# Patient Record
Sex: FEMALE | Race: WHITE | NOT HISPANIC OR LATINO | Employment: FULL TIME | URBAN - NONMETROPOLITAN AREA
[De-identification: names, ages, dates, MRNs, and addresses within clinical notes are randomized per-mention and may not be internally consistent; named-entity substitution may affect disease eponyms.]

---

## 2017-10-04 ENCOUNTER — OFFICE VISIT (OUTPATIENT)
Dept: RETAIL CLINIC | Facility: CLINIC | Age: 52
End: 2017-10-04

## 2017-10-04 VITALS — TEMPERATURE: 98.2 F

## 2017-10-04 DIAGNOSIS — Z23 NEED FOR IMMUNIZATION AGAINST INFLUENZA: Primary | ICD-10-CM

## 2017-10-04 NOTE — PROGRESS NOTES
Kristin Villalba is a 52 y.o. female who presents to the clinic for a flu shot. No fever or illness today. No contraindications for flu vaccine. Kristin filled out questionnaire and signed consent.    History of Present Illness No illness today.  Flu shot only.     The following portions of the patient's history were reviewed and updated as appropriate: allergies, current medications, past family history, past medical history, past social history, past surgical history and problem list.        Review of Systems   All other systems reviewed and are negative.        Objective   Physical Exam   Constitutional: She is oriented to person, place, and time. She appears well-developed and well-nourished.   HENT:   Head: Normocephalic and atraumatic.   Eyes: Pupils are equal, round, and reactive to light.   Neck: Neck supple.   Neurological: She is alert and oriented to person, place, and time.   Skin: Skin is warm, dry and intact.   Psychiatric: She has a normal mood and affect. Her behavior is normal. Judgment and thought content normal.         Assessment/Plan   Influenza vaccination    Flu shot given see immunizations and vaxcare form for more information.  Questionnaire, registration and health history completed.  Follow up yrly.         Follow up as needed

## 2017-10-04 NOTE — PATIENT INSTRUCTIONS
Flu shot given see immunizations and vaxcare form for more information.  Questionnaire, registration and health history completed.  Follow up yrly.

## 2018-01-19 ENCOUNTER — TRANSCRIBE ORDERS (OUTPATIENT)
Dept: ADMINISTRATIVE | Facility: HOSPITAL | Age: 53
End: 2018-01-19

## 2018-02-12 ENCOUNTER — APPOINTMENT (OUTPATIENT)
Dept: DIABETES SERVICES | Facility: HOSPITAL | Age: 53
End: 2018-02-12

## 2018-02-12 ENCOUNTER — HOSPITAL ENCOUNTER (OUTPATIENT)
Dept: DIABETES SERVICES | Facility: HOSPITAL | Age: 53
End: 2018-02-12

## 2018-05-29 ENCOUNTER — OFFICE VISIT (OUTPATIENT)
Dept: GASTROENTEROLOGY | Facility: CLINIC | Age: 53
End: 2018-05-29

## 2018-05-29 VITALS
SYSTOLIC BLOOD PRESSURE: 122 MMHG | BODY MASS INDEX: 30.82 KG/M2 | HEART RATE: 94 BPM | DIASTOLIC BLOOD PRESSURE: 76 MMHG | TEMPERATURE: 98.1 F | WEIGHT: 185 LBS | OXYGEN SATURATION: 98 % | HEIGHT: 65 IN

## 2018-05-29 DIAGNOSIS — Z86.010 HX OF COLONIC POLYPS: ICD-10-CM

## 2018-05-29 DIAGNOSIS — R13.19 OTHER DYSPHAGIA: Primary | ICD-10-CM

## 2018-05-29 DIAGNOSIS — Z83.71 FAMILY HISTORY OF POLYPS IN THE COLON: ICD-10-CM

## 2018-05-29 DIAGNOSIS — K21.9 GASTROESOPHAGEAL REFLUX DISEASE, ESOPHAGITIS PRESENCE NOT SPECIFIED: ICD-10-CM

## 2018-05-29 PROCEDURE — 99204 OFFICE O/P NEW MOD 45 MIN: CPT | Performed by: NURSE PRACTITIONER

## 2018-05-29 RX ORDER — ALPRAZOLAM 0.5 MG/1
0.5 TABLET ORAL 2 TIMES DAILY PRN
COMMUNITY

## 2018-05-29 RX ORDER — SODIUM, POTASSIUM,MAG SULFATES 17.5-3.13G
2 SOLUTION, RECONSTITUTED, ORAL ORAL ONCE
Qty: 2 BOTTLE | Refills: 0 | Status: SHIPPED | OUTPATIENT
Start: 2018-05-29 | End: 2018-05-29

## 2018-05-29 NOTE — PROGRESS NOTES
"Chief Complaint:   Chief Complaint   Patient presents with   • Difficulty Swallowing     Patient states that she feels like her medication, apples, and meat feel like they are getting stuck.   • Colon Cancer Screening         Patient ID: Luana Villalba is a 53 y.o. female     History of Present Illness: This is a very pleasant 53-year-old female who comes in today with complaints of difficulty swallowing.  She states that she has begun to have difficulty swallowing food such as medication, apples and meat.  She states that they feel as though they get \"stuck or hung\" in her mid esophageal area.  She states that she does have a history of GERD and it feels mostly controlled but if she forgets any medicine then it will flare up.  The patient's last EGD was performed on 10/7/13 for heartburn and heme-positive stools.  Small hiatal hernia found otherwise normal.    The patient's last colonoscopy was performed on 7/24/13 for heme positive stools.  The patient also does carry a previous history of colon polyps.  On 7/24/13 colonoscopy was found to be normal.    The patient denies any nausea, vomiting, epigastric pain, pyrosis or hematemesis.  The patient denies any fever or chills.  Denies any melena or hematochezia.  Denies any unintentional weight loss or loss of appetite.    Past Medical History:   Diagnosis Date   • Acid reflux    • Colon polyp    • Depression    • Hyperlipidemia    • Type 2 diabetes mellitus        Past Surgical History:   Procedure Laterality Date   • APPENDECTOMY     • CHOLECYSTECTOMY     • COLONOSCOPY  07/24/2013   • KNEE SURGERY     • UPPER GASTROINTESTINAL ENDOSCOPY  10/07/2013         Current Outpatient Prescriptions:   •  ALPRAZolam (XANAX) 0.5 MG tablet, Take 0.5 mg by mouth 2 (Two) Times a Day As Needed for Anxiety., Disp: , Rfl:   •  aspirin 81 MG tablet, Take 81 mg by mouth daily, Disp: , Rfl:   •  cyclobenzaprine (FLEXERIL) 10 MG tablet, 3 (Three) Times a Day., Disp: , Rfl:   •  " "FLUoxetine (PROzac) 40 MG capsule, Take 40 mg by mouth daily, Disp: , Rfl:   •  lisinopril (PRINIVIL,ZESTRIL) 40 MG tablet, Take 40 mg by mouth daily, Disp: , Rfl:   •  metFORMIN (GLUCOPHAGE) 500 MG tablet, Take 500 mg by mouth once daily, Disp: , Rfl:   •  pantoprazole (PROTONIX) 40 MG pack packet, Take 40 mg by mouth every morning (before breakfast), Disp: , Rfl:   •  sodium-potassium-magnesium sulfates (SUPREP BOWEL PREP KIT) 17.5-3.13-1.6 GM/180ML solution oral solution, Take 2 bottles by mouth 1 (One) Time for 1 dose. Split dose prep as directed by office instructions provided.  2 bottles = one kit., Disp: 2 bottle, Rfl: 0    Allergies   Allergen Reactions   • Ibuprofen Swelling   • Codeine Nausea And Vomiting       Social History     Social History   • Marital status:      Spouse name: N/A   • Number of children: N/A   • Years of education: N/A     Occupational History   •       Teacher [employer]     Social History Main Topics   • Smoking status: Never Smoker   • Smokeless tobacco: Never Used   • Alcohol use No   • Drug use: No   • Sexual activity: No     Other Topics Concern   • Not on file     Social History Narrative    No second hand smoke exposure.         Family History   Problem Relation Age of Onset   • Colon polyps Mother    • Colon cancer Neg Hx        Vitals:    05/29/18 1406   BP: 122/76   Pulse: 94   Temp: 98.1 °F (36.7 °C)   SpO2: 98%   Weight: 83.9 kg (185 lb)   Height: 165.1 cm (65\")       Review of Systems:    General:    Present -feeling well   Skin:    Not Present-Rash   HEENT:     Not Present-Acute visual changes or Acute hearing changes   Neck :    Not Present- swollen glands   Genitourinary:      Not Present- burning, frequency, urgency hematuria, dysuria,   Cardiovascular:   Not Present-chest pain, palpitations, or pressure   Respiratory:   Not Present- shortness of breath or cough   Gastrointestinal:  Musculoskeletal:  Neurological:  Psychiatric:   Present as mentioned in the HP    " Not Present. Recent gait disturbances.    Not Present-Seizures and weakness in extremities.    Not Present- Anxiety or Depression.       Physical Exam:    General Appearance:    Alert, cooperative, in no acute distress   Psych:    Mood appropriate    Eyes:          conjunctivae and sclerae normal, no   icterus, no pallor   ENMT:    Ears appear intact with no abnormalities noted oral mucosa moist   Neck:   No adenopathy, supple, trachea midline, no thyromegaly, no   carotid bruit, no JVD    Cardiovascular:    Regular rhythm and normal rate, normal S1 and S2, no            murmur, no gallop, no rub, no click   Gastrointestinal:     Inspection normal.  Normal bowel sounds, no masses, no organomegaly, soft round non-tender, non-distended, no guarding, no rebound or tenderness. No hepatosplenomegaly.   Skin:   No bleeding, bruising or rash   Neurologic:   nonfocal       No results found for: WBC, HGB, HCT, PLT     No results found for: NA, K, CL, CO2, BUN, CREATININE, BILITOT, ALKPHOS, ALT, AST, GLUCOSE    No results found for: INR    Body mass index is 30.79 kg/m². Patient's Body mass index is 30.79 kg/m². BMI is above normal parameters. Recommendations include: no follow-up required.    Assessment and Plan:  Assessment/Plan   Luana was seen today for difficulty swallowing and colon cancer screening.    Diagnoses and all orders for this visit:    Other dysphagia  -     Case Request; Standing  -     Case Request    Gastroesophageal reflux disease, esophagitis presence not specified  -     Case Request; Standing  -     Case Request    Hx of colonic polyps  -     Case Request; Standing  -     Case Request    Family history of polyps in the colon  Comments:  mother  Orders:  -     Case Request; Standing  -     Case Request    Other orders  -     Implement Anesthesia Orders Day of Procedure; Standing  -     Obtain Informed Consent; Standing  -     Verify bowel prep was successful; Standing  -     sodium-potassium-magnesium  sulfates (SUPREP BOWEL PREP KIT) 17.5-3.13-1.6 GM/180ML solution oral solution; Take 2 bottles by mouth 1 (One) Time for 1 dose. Split dose prep as directed by office instructions provided.  2 bottles = one kit.    As scheduled the patient for an EGD and colonoscopy as well.  I have discussed with her to continue her Protonix as she has been that should her symptoms worsen for her to call me and we will increase her Protonix to twice a day.       Patient Instructions   Food Choices for Gastroesophageal Reflux Disease, Adult  When you have gastroesophageal reflux disease (GERD), the foods you eat and your eating habits are very important. Choosing the right foods can help ease your discomfort.  What guidelines do I need to follow?  · Choose fruits, vegetables, whole grains, and low-fat dairy products.  · Choose low-fat meat, fish, and poultry.  · Limit fats such as oils, salad dressings, butter, nuts, and avocado.  · Keep a food diary. This helps you identify foods that cause symptoms.  · Avoid foods that cause symptoms. These may be different for everyone.  · Eat small meals often instead of 3 large meals a day.  · Eat your meals slowly, in a place where you are relaxed.  · Limit fried foods.  · Cook foods using methods other than frying.  · Avoid drinking alcohol.  · Avoid drinking large amounts of liquids with your meals.  · Avoid bending over or lying down until 2-3 hours after eating.  What foods are not recommended?  These are some foods and drinks that may make your symptoms worse:  Vegetables   Tomatoes. Tomato juice. Tomato and spaghetti sauce. Chili peppers. Onion and garlic. Horseradish.  Fruits   Oranges, grapefruit, and lemon (fruit and juice).  Meats   High-fat meats, fish, and poultry. This includes hot dogs, ribs, ham, sausage, salami, and wells.  Dairy   Whole milk and chocolate milk. Sour cream. Cream. Butter. Ice cream. Cream cheese.  Drinks   Coffee and tea. Bubbly (carbonated) drinks or energy  drinks.  Condiments   Hot sauce. Barbecue sauce.  Sweets/Desserts   Chocolate and cocoa. Donuts. Peppermint and spearmint.  Fats and Oils   High-fat foods. This includes French fries and potato chips.  Other   Vinegar. Strong spices. This includes black pepper, white pepper, red pepper, cayenne, holt powder, cloves, ginger, and chili powder.  The items listed above may not be a complete list of foods and drinks to avoid. Contact your dietitian for more information.   This information is not intended to replace advice given to you by your health care provider. Make sure you discuss any questions you have with your health care provider.  Document Released: 06/18/2013 Document Revised: 05/25/2017 Document Reviewed: 10/22/2014  [employer] Patient Education © 2017 [employer]        Next follow-up appointment      EMR Dragon/Transcription disclaimer:  Much of this encounter note is an electronic transcription/translation of spoken language to printed text. The electronic translation of spoken language may permit erroneous, or at times, nonsensical words or phrases to be inadvertently transcribed; although I have reviewed the note for such errors, some may still exist.

## 2018-05-30 PROBLEM — R13.19 OTHER DYSPHAGIA: Status: ACTIVE | Noted: 2018-05-30

## 2018-05-30 PROBLEM — Z83.719 FAMILY HISTORY OF POLYPS IN THE COLON: Status: ACTIVE | Noted: 2018-05-30

## 2018-05-30 PROBLEM — Z86.010 HX OF COLONIC POLYPS: Status: ACTIVE | Noted: 2018-05-30

## 2018-05-30 PROBLEM — Z83.71 FAMILY HISTORY OF POLYPS IN THE COLON: Status: ACTIVE | Noted: 2018-05-30

## 2018-05-30 PROBLEM — Z86.0100 HX OF COLONIC POLYPS: Status: ACTIVE | Noted: 2018-05-30

## 2018-05-30 PROBLEM — K21.9 GASTROESOPHAGEAL REFLUX DISEASE: Status: ACTIVE | Noted: 2018-05-30

## 2018-07-18 ENCOUNTER — ANESTHESIA (OUTPATIENT)
Dept: GASTROENTEROLOGY | Facility: HOSPITAL | Age: 53
End: 2018-07-18

## 2018-07-18 ENCOUNTER — ANESTHESIA EVENT (OUTPATIENT)
Dept: GASTROENTEROLOGY | Facility: HOSPITAL | Age: 53
End: 2018-07-18

## 2018-07-18 ENCOUNTER — HOSPITAL ENCOUNTER (OUTPATIENT)
Facility: HOSPITAL | Age: 53
Setting detail: HOSPITAL OUTPATIENT SURGERY
Discharge: HOME | End: 2018-07-18
Attending: INTERNAL MEDICINE | Admitting: NURSE ANESTHETIST, CERTIFIED REGISTERED

## 2018-07-18 ENCOUNTER — TELEPHONE (OUTPATIENT)
Dept: GASTROENTEROLOGY | Facility: CLINIC | Age: 53
End: 2018-07-18

## 2018-07-18 VITALS
HEIGHT: 64 IN | RESPIRATION RATE: 19 BRPM | HEART RATE: 68 BPM | WEIGHT: 183 LBS | OXYGEN SATURATION: 98 % | DIASTOLIC BLOOD PRESSURE: 81 MMHG | SYSTOLIC BLOOD PRESSURE: 125 MMHG | BODY MASS INDEX: 31.24 KG/M2 | TEMPERATURE: 98.2 F

## 2018-07-18 DIAGNOSIS — K21.9 GASTROESOPHAGEAL REFLUX DISEASE, ESOPHAGITIS PRESENCE NOT SPECIFIED: ICD-10-CM

## 2018-07-18 DIAGNOSIS — R13.19 OTHER DYSPHAGIA: ICD-10-CM

## 2018-07-18 PROBLEM — Z86.010 HX OF COLONIC POLYPS: Status: RESOLVED | Noted: 2018-05-30 | Resolved: 2018-07-18

## 2018-07-18 PROBLEM — Z83.719 FAMILY HISTORY OF POLYPS IN THE COLON: Status: RESOLVED | Noted: 2018-05-30 | Resolved: 2018-07-18

## 2018-07-18 PROBLEM — Z83.71 FAMILY HISTORY OF POLYPS IN THE COLON: Status: RESOLVED | Noted: 2018-05-30 | Resolved: 2018-07-18

## 2018-07-18 PROBLEM — Z86.0100 HX OF COLONIC POLYPS: Status: RESOLVED | Noted: 2018-05-30 | Resolved: 2018-07-18

## 2018-07-18 LAB — GLUCOSE BLDC GLUCOMTR-MCNC: 143 MG/DL (ref 70–130)

## 2018-07-18 PROCEDURE — 43239 EGD BIOPSY SINGLE/MULTIPLE: CPT | Performed by: INTERNAL MEDICINE

## 2018-07-18 PROCEDURE — 88305 TISSUE EXAM BY PATHOLOGIST: CPT | Performed by: INTERNAL MEDICINE

## 2018-07-18 PROCEDURE — 25010000002 PROPOFOL 10 MG/ML EMULSION: Performed by: NURSE ANESTHETIST, CERTIFIED REGISTERED

## 2018-07-18 PROCEDURE — 82962 GLUCOSE BLOOD TEST: CPT

## 2018-07-18 PROCEDURE — G0105 COLORECTAL SCRN; HI RISK IND: HCPCS | Performed by: INTERNAL MEDICINE

## 2018-07-18 RX ORDER — SODIUM CHLORIDE 9 MG/ML
100 INJECTION, SOLUTION INTRAVENOUS CONTINUOUS
Status: CANCELLED | OUTPATIENT
Start: 2018-07-18

## 2018-07-18 RX ORDER — LIDOCAINE HYDROCHLORIDE 20 MG/ML
INJECTION, SOLUTION INFILTRATION; PERINEURAL AS NEEDED
Status: DISCONTINUED | OUTPATIENT
Start: 2018-07-18 | End: 2018-07-18 | Stop reason: SURG

## 2018-07-18 RX ORDER — SODIUM CHLORIDE 0.9 % (FLUSH) 0.9 %
3 SYRINGE (ML) INJECTION AS NEEDED
Status: DISCONTINUED | OUTPATIENT
Start: 2018-07-18 | End: 2018-07-18 | Stop reason: HOSPADM

## 2018-07-18 RX ORDER — SODIUM CHLORIDE 9 MG/ML
500 INJECTION, SOLUTION INTRAVENOUS CONTINUOUS PRN
Status: DISCONTINUED | OUTPATIENT
Start: 2018-07-18 | End: 2018-07-18 | Stop reason: HOSPADM

## 2018-07-18 RX ORDER — PROPOFOL 10 MG/ML
VIAL (ML) INTRAVENOUS AS NEEDED
Status: DISCONTINUED | OUTPATIENT
Start: 2018-07-18 | End: 2018-07-18 | Stop reason: SURG

## 2018-07-18 RX ORDER — SODIUM CHLORIDE 0.9 % (FLUSH) 0.9 %
1-10 SYRINGE (ML) INJECTION AS NEEDED
Status: CANCELLED | OUTPATIENT
Start: 2018-07-18

## 2018-07-18 RX ADMIN — PROPOFOL 30 MG: 10 INJECTION, EMULSION INTRAVENOUS at 07:51

## 2018-07-18 RX ADMIN — PROPOFOL 60 MG: 10 INJECTION, EMULSION INTRAVENOUS at 07:32

## 2018-07-18 RX ADMIN — PROPOFOL 30 MG: 10 INJECTION, EMULSION INTRAVENOUS at 07:41

## 2018-07-18 RX ADMIN — PROPOFOL 30 MG: 10 INJECTION, EMULSION INTRAVENOUS at 07:36

## 2018-07-18 RX ADMIN — PROPOFOL 30 MG: 10 INJECTION, EMULSION INTRAVENOUS at 07:38

## 2018-07-18 RX ADMIN — PROPOFOL 30 MG: 10 INJECTION, EMULSION INTRAVENOUS at 07:46

## 2018-07-18 RX ADMIN — PROPOFOL 30 MG: 10 INJECTION, EMULSION INTRAVENOUS at 07:39

## 2018-07-18 RX ADMIN — PROPOFOL 30 MG: 10 INJECTION, EMULSION INTRAVENOUS at 07:34

## 2018-07-18 RX ADMIN — PROPOFOL 30 MG: 10 INJECTION, EMULSION INTRAVENOUS at 07:43

## 2018-07-18 RX ADMIN — LIDOCAINE HYDROCHLORIDE 50 MG: 20 INJECTION, SOLUTION INFILTRATION; PERINEURAL at 07:32

## 2018-07-18 RX ADMIN — SODIUM CHLORIDE: 9 INJECTION, SOLUTION INTRAVENOUS at 07:31

## 2018-07-18 RX ADMIN — SODIUM CHLORIDE 500 ML: 9 INJECTION, SOLUTION INTRAVENOUS at 07:13

## 2018-07-18 RX ADMIN — PROPOFOL 30 MG: 10 INJECTION, EMULSION INTRAVENOUS at 07:48

## 2018-07-18 RX ADMIN — PROPOFOL 30 MG: 10 INJECTION, EMULSION INTRAVENOUS at 07:45

## 2018-07-18 RX ADMIN — PROPOFOL 30 MG: 10 INJECTION, EMULSION INTRAVENOUS at 07:50

## 2018-07-18 NOTE — ANESTHESIA POSTPROCEDURE EVALUATION
Patient: PUNEET Villalba    Procedure Summary     Date:  07/18/18 Room / Location:  [facility] ENDOSCOPY 5 / [facility]    Anesthesia Start:  0731 Anesthesia Stop:  0800    Procedures:       ESOPHAGOGASTRODUODENOSCOPY WITH ANESTHESIA (N/A Esophagus)      COLONOSCOPY WITH ANESTHESIA (N/A ) Diagnosis:       Hx of colonic polyps      Family history of polyps in the colon      Other dysphagia      Gastroesophageal reflux disease, esophagitis presence not specified      (Hx of colonic polyps [Z86.010])      (Family history of polyps in the colon [Z83.71])      (Other dysphagia [R13.19])      (Gastroesophageal reflux disease, esophagitis presence not specified [K21.9])    Surgeon:  Meghann Ovalle MD Provider:  Jayme Brandt CRNA    Anesthesia Type:  general ASA Status:  2          Anesthesia Type: general  Last vitals  BP   132/84 (07/18/18 0656)   Temp   98.2 °F (36.8 °C) (07/18/18 0656)   Pulse   94 (07/18/18 0656)   Resp   18 (07/18/18 0656)     SpO2   98 % (07/18/18 0656)     Post Anesthesia Care and Evaluation    Patient location during evaluation: PHASE II  Patient participation: complete - patient participated  Level of consciousness: awake  Pain score: 0  Pain management: adequate  Airway patency: patent  Anesthetic complications: No anesthetic complications  PONV Status: none  Cardiovascular status: acceptable  Respiratory status: acceptable  Hydration status: acceptable

## 2018-07-18 NOTE — H&P
Chief Complaint:   Dysphagia and colon cancer screening     Subjective     HPI:   She has been having complaints of dysphagia for pills and eating apples although it seems to be less now that she is here today.    Mother had colon polyps.  She is not certain of the age that she thinks it was greater in 60 years old.  Patient's last colonoscopy was in July 2013.  Although she is labeled as having had polyps, chart review indicates that she has not had any polyps.    Past Medical History:   Past Medical History:   Diagnosis Date   • Acid reflux    • Colon polyp    • Depression    • Hyperlipidemia    • Hypertension    • Type 2 diabetes mellitus (CMS/HCC)        Past Surgical History:  Past Surgical History:   Procedure Laterality Date   • APPENDECTOMY     • CHOLECYSTECTOMY     • COLONOSCOPY  07/24/2013   • KNEE SURGERY     • UPPER GASTROINTESTINAL ENDOSCOPY  10/07/2013        Family History:  Family History   Problem Relation Age of Onset   • Colon polyps Mother    • Colon cancer Neg Hx        Social History:   reports that she has never smoked. She has never used smokeless tobacco. She reports that she does not drink alcohol or use drugs.    Medications:   Prescriptions Prior to Admission   Medication Sig Dispense Refill Last Dose   • ALPRAZolam (XANAX) 0.5 MG tablet Take 0.5 mg by mouth 2 (Two) Times a Day As Needed for Anxiety.   More than a month at Unknown time   • aspirin 81 MG tablet Take 81 mg by mouth daily   7/16/2018   • cyclobenzaprine (FLEXERIL) 10 MG tablet 3 (Three) Times a Day.   7/16/2018   • FLUoxetine (PROzac) 40 MG capsule Take 40 mg by mouth daily   7/16/2018   • lisinopril (PRINIVIL,ZESTRIL) 40 MG tablet Take 40 mg by mouth daily   7/16/2018   • metFORMIN (GLUCOPHAGE) 500 MG tablet Take 500 mg by mouth once daily   7/16/2018   • pantoprazole (PROTONIX) 40 MG pack packet Take 40 mg by mouth every morning (before breakfast)   7/16/2018       Allergies:  Ibuprofen and Codeine    ROS:    General:  "Weight stable  Resp: No SOA  Cardiovascular: No CP      Objective     /84 (Patient Position: Sitting)   Pulse 94   Temp 98.2 °F (36.8 °C) (Temporal Artery )   Resp 18   Ht 162.6 cm (64\")   Wt 83 kg (183 lb)   SpO2 98%   BMI 31.41 kg/m²     Physical Exam   Constitutional: Pt is oriented to person, place, and in no distress.  Eyes: No icterus  ENMT: Unremarkable   Cardiovascular: Normal rate, regular rhythm.    Pulmonary/Chest: No distress.  No audible wheezes  Abdominal: Soft.   Skin: Skin is warm and dry.   Psychiatric: Mood, memory, affect and judgment appear normal.     Assessment/Plan     Diagnosis:  Dysphagia  Screening    Anticipated Surgical Procedure:  Colonoscopy    The risks, benefits, and alternatives of colonoscopy were reviewed with the patient today.  Risks including perforation of the colon possibly requiring surgery or colostomy.  Additional risks include risk of bleeding from biopsies or removal of colon tissue.  There is also the risk of a drug reaction or problems with anesthesia.  This will be discussed with the further by the anesthesia team on the day of the procedure.  Lastly there is a possibility of missing a colon polyp or cancer.  The benefits include the diagnosis and management of disease of the colon and rectum.  Alternatives to colonoscopy include barium enema, laboratory testing, radiographic evaluation, or no intervention.  The patient verbalizes understanding and agrees.    Much of this encounter note is an electronic transcription/translation of spoken language to printed text. The electronic translation of spoken language may permit erroneous, or at times, nonsensical words or phrases to be inadvertently transcribed; although I have reviewed the note for such errors, some may still exist.        "

## 2018-07-18 NOTE — ANESTHESIA PREPROCEDURE EVALUATION
Anesthesia Evaluation     Patient summary reviewed   no history of anesthetic complications:  NPO Solid Status: > 8 hours  NPO Liquid Status: > 4 hours           Airway   Mallampati: I  TM distance: >3 FB  Neck ROM: full  No difficulty expected  Dental - normal exam     Pulmonary - negative pulmonary ROS and normal exam   Cardiovascular - normal exam  Exercise tolerance: excellent (>7 METS)    (+) hypertension well controlled less than 2 medications, hyperlipidemia,       Neuro/Psych  (+) psychiatric history Depression,     GI/Hepatic/Renal/Endo    (+)  GERD well controlled,  diabetes mellitus type 2 well controlled,     Musculoskeletal (-) negative ROS    Abdominal  - normal exam   Substance History - negative use     OB/GYN          Other - negative ROS                       Anesthesia Plan    ASA 2     general     intravenous induction   Anesthetic plan and risks discussed with patient.

## 2018-07-19 ENCOUNTER — TELEPHONE (OUTPATIENT)
Dept: GASTROENTEROLOGY | Facility: CLINIC | Age: 53
End: 2018-07-19

## 2018-07-19 LAB
CYTO UR: NORMAL
LAB AP CASE REPORT: NORMAL
LAB AP CLINICAL INFORMATION: NORMAL
PATH REPORT.FINAL DX SPEC: NORMAL
PATH REPORT.GROSS SPEC: NORMAL

## 2018-07-19 NOTE — TELEPHONE ENCOUNTER
----- Message from Meghann Ovalle MD sent at 7/19/2018  2:50 PM CDT -----  I am writing to inform you that your endoscopy biopsies were good.     If you have any questions, please call our office.      Sincerely,        Meghann Ovalle MD

## 2018-10-25 ENCOUNTER — OFFICE VISIT (OUTPATIENT)
Dept: RETAIL CLINIC | Facility: CLINIC | Age: 53
End: 2018-10-25

## 2018-10-25 VITALS — TEMPERATURE: 98.1 F

## 2018-10-25 DIAGNOSIS — Z23 FLU VACCINE NEED: Primary | ICD-10-CM

## 2018-10-25 NOTE — PATIENT INSTRUCTIONS
Forms completed.  Flublok given see immunizations for further information.  VIS given. Follow up as needed.

## 2018-10-25 NOTE — PROGRESS NOTES
PUNEET Villalba is a 53 y.o. female who presents to the clinic for a flu shot. No fever or illness today. No contraindications for flu vaccine. PUNEET filled out questionnaire and signed consent.    History of Present Illness No illness or fever  The following portions of the patient's history were reviewed and updated as appropriate: allergies, current medications, past family history, past medical history, past social history, past surgical history and problem list.        Review of Systems   Constitutional: Negative for fever.         Objective   Physical Exam   Constitutional: She is oriented to person, place, and time. She appears well-developed and well-nourished.   HENT:   Head: Normocephalic and atraumatic.   Eyes: Pupils are equal, round, and reactive to light. Conjunctivae are normal.   Neck: Neck supple.   Neurological: She is alert and oriented to person, place, and time.   Skin: Skin is warm and dry.   Psychiatric: She has a normal mood and affect. Her speech is normal and behavior is normal. Judgment and thought content normal.         Assessment/Plan   Influenza vaccination    Forms completed.  Flublok given see immunizations for further information.  VIS given. Follow up as needed.

## 2019-10-03 ENCOUNTER — IMMUNIZATION (OUTPATIENT)
Dept: RETAIL CLINIC | Facility: CLINIC | Age: 54
End: 2019-10-03

## 2019-10-03 VITALS — TEMPERATURE: 98.5 F

## 2019-10-03 DIAGNOSIS — Z28.39 IMMUNIZATION DEFICIENCY: Primary | ICD-10-CM

## 2019-10-03 PROCEDURE — 90674 CCIIV4 VAC NO PRSV 0.5 ML IM: CPT | Performed by: NURSE PRACTITIONER

## 2019-10-03 PROCEDURE — 90471 IMMUNIZATION ADMIN: CPT | Performed by: NURSE PRACTITIONER

## 2019-10-03 NOTE — PROGRESS NOTES
PUNEET Villalba is a 54 y.o. female who presents to the clinic for a flu shot. No fever or illness today. No contraindications for flu vaccine. PUNEET filled out questionnaire and signed consent.    History of Present Illness No illness or fever            Review of Systems   Constitutional: Negative for fever.          Objective   Physical Exam   Constitutional: She is oriented to person, place, and time. She appears well-developed and well-nourished.   HENT:   Head: Normocephalic and atraumatic.   Eyes: Pupils are equal, round, and reactive to light.   Neck: Neck supple.   Neurological: She is alert and oriented to person, place, and time.   Skin: Skin is warm, dry and intact.   Psychiatric: She has a normal mood and affect. Her speech is normal and behavior is normal. Judgment and thought content normal.         Assessment/Plan   Influenza vaccination    Forms competed. No restrictions.  Follow up as needed.

## 2020-06-29 ENCOUNTER — TRANSCRIBE ORDERS (OUTPATIENT)
Dept: ADMINISTRATIVE | Facility: HOSPITAL | Age: 55
End: 2020-06-29

## 2020-06-29 DIAGNOSIS — Z12.31 ENCOUNTER FOR SCREENING MAMMOGRAM FOR MALIGNANT NEOPLASM OF BREAST: Primary | ICD-10-CM

## 2020-07-07 ENCOUNTER — HOSPITAL ENCOUNTER (OUTPATIENT)
Dept: MAMMOGRAPHY | Facility: HOSPITAL | Age: 55
Discharge: HOME | End: 2020-07-07
Admitting: FAMILY MEDICINE

## 2020-07-07 DIAGNOSIS — Z12.31 ENCOUNTER FOR SCREENING MAMMOGRAM FOR MALIGNANT NEOPLASM OF BREAST: ICD-10-CM

## 2020-07-07 PROCEDURE — 77063 BREAST TOMOSYNTHESIS BI: CPT

## 2020-07-07 PROCEDURE — 77067 SCR MAMMO BI INCL CAD: CPT

## 2021-04-19 ENCOUNTER — TRANSCRIBE ORDERS (OUTPATIENT)
Dept: ADMINISTRATIVE | Facility: HOSPITAL | Age: 56
End: 2021-04-19

## 2021-04-19 DIAGNOSIS — R10.2 PELVIC AND PERINEAL PAIN: Primary | ICD-10-CM

## 2021-04-20 ENCOUNTER — HOSPITAL ENCOUNTER (OUTPATIENT)
Dept: ULTRASOUND IMAGING | Facility: HOSPITAL | Age: 56
Discharge: HOME | End: 2021-04-20
Admitting: FAMILY MEDICINE

## 2021-04-20 DIAGNOSIS — R10.2 PELVIC AND PERINEAL PAIN: ICD-10-CM

## 2021-04-20 PROCEDURE — 76830 TRANSVAGINAL US NON-OB: CPT

## 2021-04-22 ENCOUNTER — LAB (OUTPATIENT)
Dept: LAB | Facility: HOSPITAL | Age: 56
End: 2021-04-22

## 2021-04-22 ENCOUNTER — TRANSCRIBE ORDERS (OUTPATIENT)
Dept: LAB | Facility: HOSPITAL | Age: 56
End: 2021-04-22

## 2021-04-22 DIAGNOSIS — R05.9 COUGH: ICD-10-CM

## 2021-04-22 DIAGNOSIS — R50.9 FEVER, UNSPECIFIED: ICD-10-CM

## 2021-04-22 DIAGNOSIS — Z01.818 PRE-OP TESTING: Primary | ICD-10-CM

## 2021-04-22 LAB — SARS-COV-2 ORF1AB RESP QL NAA+PROBE: DETECTED

## 2021-04-22 PROCEDURE — U0004 COV-19 TEST NON-CDC HGH THRU: HCPCS | Performed by: PHYSICIAN ASSISTANT

## 2021-04-22 PROCEDURE — C9803 HOPD COVID-19 SPEC COLLECT: HCPCS | Performed by: PHYSICIAN ASSISTANT

## 2021-05-04 ENCOUNTER — OFFICE VISIT (OUTPATIENT)
Dept: OBSTETRICS AND GYNECOLOGY | Facility: CLINIC | Age: 56
End: 2021-05-04

## 2021-05-04 VITALS
BODY MASS INDEX: 32.27 KG/M2 | DIASTOLIC BLOOD PRESSURE: 80 MMHG | SYSTOLIC BLOOD PRESSURE: 138 MMHG | WEIGHT: 189 LBS | HEIGHT: 64 IN

## 2021-05-04 DIAGNOSIS — R93.89 THICKENED ENDOMETRIUM: Primary | ICD-10-CM

## 2021-05-04 PROCEDURE — 58100 BIOPSY OF UTERUS LINING: CPT | Performed by: NURSE PRACTITIONER

## 2021-05-04 PROCEDURE — 88305 TISSUE EXAM BY PATHOLOGIST: CPT | Performed by: NURSE PRACTITIONER

## 2021-05-04 RX ORDER — CLONAZEPAM 0.5 MG/1
0.5 TABLET ORAL
COMMUNITY
Start: 2020-11-13

## 2021-05-04 RX ORDER — LOVASTATIN 40 MG/1
TABLET ORAL
COMMUNITY
Start: 2020-11-14

## 2021-05-04 RX ORDER — CETIRIZINE HYDROCHLORIDE 10 MG/1
10 TABLET ORAL DAILY
COMMUNITY

## 2021-05-04 NOTE — PROGRESS NOTES
"Subjective     PUNEET Villalba is a 55 y.o. female    Patient is here today as a new patient referral from Dr. Jumana Cole.  She had thickened endometrium on pelvic ultrasound.  The ultrasound was done due to pelvic pain.  The pain has since resolved.  Patient has been menopausal for approximately 7 years.  She has not had any bleeding or spotting.        /80   Ht 162.6 cm (64\")   Wt 85.7 kg (189 lb)   LMP  (LMP Unknown)   Breastfeeding No   BMI 32.44 kg/m²     Outpatient Encounter Medications as of 5/4/2021   Medication Sig Dispense Refill   • ALPRAZolam (XANAX) 0.5 MG tablet Take 0.5 mg by mouth 2 (Two) Times a Day As Needed for Anxiety.     • aspirin 81 MG tablet Take 81 mg by mouth daily     • cetirizine (zyrTEC) 10 MG tablet Take 10 mg by mouth Daily.     • clonazePAM (KlonoPIN) 0.5 MG tablet Take 0.5 mg by mouth.     • cyclobenzaprine (FLEXERIL) 10 MG tablet 3 (Three) Times a Day.     • FLUoxetine (PROzac) 40 MG capsule Take 40 mg by mouth daily     • lisinopril (PRINIVIL,ZESTRIL) 40 MG tablet Take 40 mg by mouth daily     • lovastatin (MEVACOR) 40 MG tablet      • metFORMIN (GLUCOPHAGE) 500 MG tablet Take 500 mg by mouth once daily     • pantoprazole (PROTONIX) 40 MG pack packet Take 40 mg by mouth every morning (before breakfast)       No facility-administered encounter medications on file as of 5/4/2021.       Surgical History  Past Surgical History:   Procedure Laterality Date   • APPENDECTOMY     • CHOLECYSTECTOMY     • COLONOSCOPY  07/24/2013   • COLONOSCOPY N/A 7/18/2018    Procedure: COLONOSCOPY WITH ANESTHESIA;  Surgeon: Meghann Ovalle MD;  Location: [facility] ENDOSCOPY;  Service: Gastroenterology   • ENDOSCOPY N/A 7/18/2018    Procedure: ESOPHAGOGASTRODUODENOSCOPY WITH ANESTHESIA;  Surgeon: Meghann Ovalle MD;  Location: [facility] ENDOSCOPY;  Service: Gastroenterology   • KNEE SURGERY     • UPPER GASTROINTESTINAL ENDOSCOPY  10/07/2013   • WISDOM TOOTH EXTRACTION         Family History  Family " History   Problem Relation Age of Onset   • Hypertension Father    • Diabetes Mother    • Hypertension Mother    • Breast cancer Paternal Grandmother 70   • Colon cancer Neg Hx    • Colon polyps Neg Hx    • Ovarian cancer Neg Hx    • Uterine cancer Neg Hx    • Melanoma Neg Hx    • Stroke Neg Hx    • Osteoporosis Neg Hx        The following portions of the patient's history were reviewed and updated as appropriate: allergies, current medications, past family history, past medical history, past social history, past surgical history and problem list.    Review of Systems   Constitutional: Negative for activity change, appetite change, chills, diaphoresis, fatigue, fever, unexpected weight gain and unexpected weight loss.   HENT: Negative for congestion, dental problem, drooling, ear discharge, ear pain, facial swelling, hearing loss, mouth sores, nosebleeds, postnasal drip, rhinorrhea, sinus pressure, sneezing, sore throat, swollen glands, tinnitus, trouble swallowing and voice change.    Eyes: Negative for blurred vision, double vision, photophobia, pain, discharge, redness, itching and visual disturbance.   Respiratory: Negative for apnea, cough, choking, chest tightness, shortness of breath, wheezing and stridor.    Cardiovascular: Negative for chest pain, palpitations and leg swelling.   Gastrointestinal: Negative for abdominal distention, abdominal pain, anal bleeding, blood in stool, constipation, diarrhea, nausea, rectal pain, vomiting, GERD and indigestion.   Endocrine: Negative for cold intolerance, heat intolerance, polydipsia, polyphagia and polyuria.   Genitourinary: Negative for amenorrhea, breast discharge, breast lump, breast pain, decreased libido, decreased urine volume, difficulty urinating, dyspareunia, dysuria, flank pain, frequency, genital sores, hematuria, menstrual problem, pelvic pain, pelvic pressure, urgency, urinary incontinence, vaginal bleeding, vaginal discharge and vaginal pain.    Musculoskeletal: Negative for arthralgias, back pain, gait problem, joint swelling, myalgias, neck pain, neck stiffness and bursitis.   Skin: Negative for color change, dry skin and rash.   Allergic/Immunologic: Negative for environmental allergies, food allergies and immunocompromised state.   Neurological: Negative for dizziness, tremors, seizures, syncope, facial asymmetry, speech difficulty, weakness, light-headedness, numbness, headache, memory problem and confusion.   Hematological: Negative for adenopathy. Does not bruise/bleed easily.   Psychiatric/Behavioral: Negative for agitation, behavioral problems, decreased concentration, dysphoric mood, hallucinations, self-injury, sleep disturbance, suicidal ideas, negative for hyperactivity, depressed mood and stress. The patient is not nervous/anxious.        Objective   Physical Exam  Vitals and nursing note reviewed. Exam conducted with a chaperone present.   Constitutional:       Appearance: She is well-developed.   HENT:      Head: Normocephalic and atraumatic.   Abdominal:      General: There is no distension.      Palpations: Abdomen is soft.      Tenderness: There is no abdominal tenderness.      Hernia: There is no hernia in the left inguinal area or right inguinal area.   Genitourinary:     General: Normal vulva.      Exam position: Lithotomy position.      Labia:         Right: No rash, tenderness, lesion or injury.         Left: No rash, tenderness, lesion or injury.       Vagina: Normal. No vaginal discharge, erythema, tenderness or bleeding.      Cervix: Normal.      Uterus: Normal. Not enlarged and not tender.       Adnexa: Right adnexa normal and left adnexa normal.        Right: No mass, tenderness or fullness.          Left: No mass, tenderness or fullness.     Lymphadenopathy:      Lower Body: No right inguinal adenopathy. No left inguinal adenopathy.   Skin:     General: Skin is warm and dry.   Neurological:      Mental Status: She is alert  and oriented to person, place, and time.   Psychiatric:         Mood and Affect: Mood normal.         Behavior: Behavior normal.         Thought Content: Thought content normal.         Judgment: Judgment normal.         Assessment/Plan   Diagnoses and all orders for this visit:    1. Thickened endometrium (Primary)  Comments:  Patient was referred by Dr. Jumana Cole for thickened endometrium.  Ultrasound was done at [facility].  Endometrium was 1.1 cm.  Endobiopsy was done today.  Orders:  -     Tissue Pathology Exam         Patient's (Body mass index is 32.44 kg/m².) indicates that they are obese (BMI >30) with obesity-related health conditions that include hypertension and diabetes mellitus . Obesity is unchanged. BMI is is above average; BMI management plan is completed. We discussed portion control and increasing exercise.      Alessandra Gracia, APRN  5/4/2021

## 2021-05-04 NOTE — PATIENT INSTRUCTIONS
"BMI for Adults  What is BMI?  Body mass index (BMI) is a number that is calculated from a person's weight and height. BMI can help estimate how much of a person's weight is composed of fat. BMI does not measure body fat directly. Rather, it is an alternative to procedures that directly measure body fat, which can be difficult and expensive.  BMI can help identify people who may be at higher risk for certain medical problems.  What are BMI measurements used for?  BMI is used as a screening tool to identify possible weight problems. It helps determine whether a person is obese, overweight, a healthy weight, or underweight.  BMI is useful for:  · Identifying a weight problem that may be related to a medical condition or may increase the risk for medical problems.  · Promoting changes, such as changes in diet and exercise, to help reach a healthy weight. BMI screening can be repeated to see if these changes are working.  How is BMI calculated?  BMI involves measuring your weight in relation to your height. Both height and weight are measured, and the BMI is calculated from those numbers. This can be done either in English (U[place] or metric measurements. Note that charts and online BMI calculators are available to help you find your BMI quickly and easily without having to do these calculations yourself.  To calculate your BMI in English (U.S[place] measurements:    1. Measure your weight in pounds (lb).  2. Multiply the number of pounds by 703.  ? For example, for a person who weighs 180 lb, multiply that number by 703, which equals 126,540.  3. Measure your height in inches. Then multiply that number by itself to get a measurement called \"inches squared.\"  ? For example, for a person who is 70 inches tall, the \"inches squared\" measurement is 70 inches x 70 inches, which equals 4,900 inches squared.  4. Divide the total from step 2 (number of lb x 703) by the total from step 3 (inches squared): 126,540 ÷ 4,900 = 25.8. This is " "your BMI.  To calculate your BMI in metric measurements:  1. Measure your weight in kilograms (kg).  2. Measure your height in meters (m). Then multiply that number by itself to get a measurement called \"meters squared.\"  ? For example, for a person who is 1.75 m tall, the \"meters squared\" measurement is 1.75 m x 1.75 m, which is equal to 3.1 meters squared.  3. Divide the number of kilograms (your weight) by the meters squared number. In this example: 70 ÷ 3.1 = 22.6. This is your BMI.  What do the results mean?  BMI charts are used to identify whether you are underweight, normal weight, overweight, or obese. The following guidelines will be used:  · Underweight: BMI less than 18.5.  · Normal weight: BMI between 18.5 and 24.9.  · Overweight: BMI between 25 and 29.9.  · Obese: BMI of 30 or above.  Keep these notes in mind:  · Weight includes both fat and muscle, so someone with a muscular build, such as an athlete, may have a BMI that is higher than 24.9. In cases like these, BMI is not an accurate measure of body fat.  · To determine if excess body fat is the cause of a BMI of 25 or higher, further assessments may need to be done by a health care provider.  · BMI is usually interpreted in the same way for men and women.  Where to find more information  For more information about BMI, including tools to quickly calculate your BMI, go to these websites:  · Centers for Disease Control and Prevention: www.cdc.gov  · American Heart Association: www.heart.org  · National Heart, Lung, and Blood [place]: www.nhlbi.nih.gov  Summary  · Body mass index (BMI) is a number that is calculated from a person's weight and height.  · BMI may help estimate how much of a person's weight is composed of fat. BMI can help identify those who may be at higher risk for certain medical problems.  · BMI can be measured using English measurements or metric measurements.  · BMI charts are used to identify whether you are underweight, normal " weight, overweight, or obese.  This information is not intended to replace advice given to you by your health care provider. Make sure you discuss any questions you have with your health care provider.  Document Revised: 09/09/2020 Document Reviewed: 07/17/2020  Elsevier Patient Education © 2021 [employer]

## 2021-05-04 NOTE — PROGRESS NOTES
Procedure   Procedures    An endometrial biopsy was performed in the office today.  The cervix was visualized with a speculum and prepped with Betadine.  The anterior lip was grasped with a tenaculum and a standard endometrial sampling Pipelle was passed into the uterine cavity.  The patient experienced mild cramping and the uterus sounded to 6 cm.  A scant amount of tissue was obtained with 3 passes.  The tenaculum was removed and the site was hemostatic.  She tolerated the procedure well.

## 2022-06-02 ENCOUNTER — TRANSCRIBE ORDERS (OUTPATIENT)
Dept: ADMINISTRATIVE | Facility: HOSPITAL | Age: 57
End: 2022-06-02

## 2022-06-02 DIAGNOSIS — R10.84 ABDOMINAL PAIN, GENERALIZED: Primary | ICD-10-CM

## 2022-06-06 ENCOUNTER — HOSPITAL ENCOUNTER (OUTPATIENT)
Dept: CT IMAGING | Facility: HOSPITAL | Age: 57
Discharge: HOME | End: 2022-06-06
Admitting: FAMILY MEDICINE

## 2022-06-06 DIAGNOSIS — R10.84 ABDOMINAL PAIN, GENERALIZED: ICD-10-CM

## 2022-06-06 LAB — CREAT BLDA-MCNC: 1.1 MG/DL (ref 0.6–1.3)

## 2022-06-06 PROCEDURE — 82565 ASSAY OF CREATININE: CPT

## 2022-06-06 PROCEDURE — 25010000002 IOPAMIDOL 61 % SOLUTION: Performed by: FAMILY MEDICINE

## 2022-06-06 PROCEDURE — 74177 CT ABD & PELVIS W/CONTRAST: CPT

## 2022-06-06 RX ADMIN — IOPAMIDOL 100 ML: 612 INJECTION, SOLUTION INTRAVENOUS at 09:09

## 2023-06-20 PROBLEM — R10.9 ABDOMINAL PAIN: Status: ACTIVE | Noted: 2023-06-20

## 2023-06-20 PROBLEM — R19.4 CHANGE IN BOWEL HABITS: Status: ACTIVE | Noted: 2023-06-20

## 2023-07-25 ENCOUNTER — TELEPHONE (OUTPATIENT)
Dept: GASTROENTEROLOGY | Facility: CLINIC | Age: 58
End: 2023-07-25
Payer: COMMERCIAL
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
## 2023-08-17 ENCOUNTER — HOSPITAL ENCOUNTER (OUTPATIENT)
Facility: HOSPITAL | Age: 58
Setting detail: HOSPITAL OUTPATIENT SURGERY
Discharge: HOME | End: 2023-08-17
Attending: INTERNAL MEDICINE | Admitting: INTERNAL MEDICINE
Payer: COMMERCIAL

## 2023-08-17 ENCOUNTER — ANESTHESIA (OUTPATIENT)
Dept: GASTROENTEROLOGY | Facility: HOSPITAL | Age: 58
End: 2023-08-17
Payer: COMMERCIAL

## 2023-08-17 ENCOUNTER — ANESTHESIA EVENT (OUTPATIENT)
Dept: GASTROENTEROLOGY | Facility: HOSPITAL | Age: 58
End: 2023-08-17
Payer: COMMERCIAL

## 2023-08-17 VITALS
OXYGEN SATURATION: 93 % | DIASTOLIC BLOOD PRESSURE: 71 MMHG | RESPIRATION RATE: 15 BRPM | SYSTOLIC BLOOD PRESSURE: 102 MMHG | BODY MASS INDEX: 30.22 KG/M2 | HEART RATE: 80 BPM | WEIGHT: 177 LBS | TEMPERATURE: 97 F | HEIGHT: 64 IN

## 2023-08-17 DIAGNOSIS — R19.4 CHANGE IN BOWEL HABITS: ICD-10-CM

## 2023-08-17 DIAGNOSIS — R10.30 LOWER ABDOMINAL PAIN: ICD-10-CM

## 2023-08-17 LAB — GLUCOSE BLDC GLUCOMTR-MCNC: 155 MG/DL (ref 70–130)

## 2023-08-17 PROCEDURE — 82948 REAGENT STRIP/BLOOD GLUCOSE: CPT

## 2023-08-17 PROCEDURE — 45380 COLONOSCOPY AND BIOPSY: CPT | Performed by: INTERNAL MEDICINE

## 2023-08-17 PROCEDURE — 88305 TISSUE EXAM BY PATHOLOGIST: CPT | Performed by: INTERNAL MEDICINE

## 2023-08-17 PROCEDURE — 25010000002 PROPOFOL 10 MG/ML EMULSION: Performed by: NURSE ANESTHETIST, CERTIFIED REGISTERED

## 2023-08-17 RX ORDER — SODIUM CHLORIDE 0.9 % (FLUSH) 0.9 %
10 SYRINGE (ML) INJECTION AS NEEDED
Status: CANCELLED | OUTPATIENT
Start: 2023-08-17

## 2023-08-17 RX ORDER — SODIUM CHLORIDE 0.9 % (FLUSH) 0.9 %
10 SYRINGE (ML) INJECTION EVERY 12 HOURS SCHEDULED
Status: CANCELLED | OUTPATIENT
Start: 2023-08-17

## 2023-08-17 RX ORDER — SODIUM CHLORIDE 9 MG/ML
40 INJECTION, SOLUTION INTRAVENOUS AS NEEDED
Status: CANCELLED | OUTPATIENT
Start: 2023-08-17

## 2023-08-17 RX ORDER — SODIUM CHLORIDE 0.9 % (FLUSH) 0.9 %
10 SYRINGE (ML) INJECTION AS NEEDED
Status: DISCONTINUED | OUTPATIENT
Start: 2023-08-17 | End: 2023-08-17 | Stop reason: HOSPADM

## 2023-08-17 RX ORDER — LIDOCAINE HYDROCHLORIDE 20 MG/ML
INJECTION, SOLUTION EPIDURAL; INFILTRATION; INTRACAUDAL; PERINEURAL AS NEEDED
Status: DISCONTINUED | OUTPATIENT
Start: 2023-08-17 | End: 2023-08-17 | Stop reason: SURG

## 2023-08-17 RX ORDER — SODIUM CHLORIDE 9 MG/ML
500 INJECTION, SOLUTION INTRAVENOUS CONTINUOUS PRN
Status: DISCONTINUED | OUTPATIENT
Start: 2023-08-17 | End: 2023-08-17 | Stop reason: HOSPADM

## 2023-08-17 RX ORDER — PROPOFOL 10 MG/ML
VIAL (ML) INTRAVENOUS AS NEEDED
Status: DISCONTINUED | OUTPATIENT
Start: 2023-08-17 | End: 2023-08-17 | Stop reason: SURG

## 2023-08-17 RX ORDER — LIDOCAINE HYDROCHLORIDE 10 MG/ML
0.5 INJECTION, SOLUTION EPIDURAL; INFILTRATION; INTRACAUDAL; PERINEURAL ONCE AS NEEDED
Status: DISCONTINUED | OUTPATIENT
Start: 2023-08-17 | End: 2023-08-17 | Stop reason: HOSPADM

## 2023-08-17 RX ORDER — SODIUM CHLORIDE 9 MG/ML
100 INJECTION, SOLUTION INTRAVENOUS CONTINUOUS
Status: CANCELLED | OUTPATIENT
Start: 2023-08-17

## 2023-08-17 RX ADMIN — PROPOFOL INJECTABLE EMULSION 200 MG: 10 INJECTION, EMULSION INTRAVENOUS at 13:16

## 2023-08-17 RX ADMIN — PROPOFOL INJECTABLE EMULSION 100 MG: 10 INJECTION, EMULSION INTRAVENOUS at 13:04

## 2023-08-17 RX ADMIN — SODIUM CHLORIDE 500 ML: 9 INJECTION, SOLUTION INTRAVENOUS at 12:03

## 2023-08-17 RX ADMIN — LIDOCAINE HYDROCHLORIDE 100 MG: 20 INJECTION, SOLUTION EPIDURAL; INFILTRATION; INTRACAUDAL; PERINEURAL at 13:04

## 2023-08-17 NOTE — H&P
Chief Complaint:   Change in bowel pattern/diarrhea    Subjective     HPI:   She has been having a change in her bowel pattern recently.  Diarrhea more so than constipation.  Plus association with bloating.  Improved with Bentyl.    Past Medical History:   Past Medical History:   Diagnosis Date    ADD (attention deficit disorder)     Anxiety     Depression     Family history of colonic polyps     GERD (gastroesophageal reflux disease)     History of colon polyps     Hyperlipidemia     Hypertension     Type 2 diabetes mellitus        Past Surgical History:  Past Surgical History:   Procedure Laterality Date    APPENDECTOMY      CHOLECYSTECTOMY      COLONOSCOPY N/A 07/18/2018    The entire examined colon is normal on direct and retroflexion views; No specimens collected; Repeat 10 years    COLONOSCOPY  07/24/2013    Normal colonoscopy; Repeat 5 years    COLONOSCOPY  07/18/2001    Dr. Alvarado-Normal colonoscopy; Right-sided biopsies obtained    ENDOSCOPY N/A 07/18/2018    Small HH; A few gastric polyps-biopsied; Normal examined duodenum    ENDOSCOPY      HH; Normal stomach; Normal examined duodenum    KNEE ARTHROSCOPY Left     WISDOM TOOTH EXTRACTION          Family History:  Family History   Problem Relation Age of Onset    Colon polyps Mother     Diabetes Mother     Hypertension Mother     Hypertension Father     Breast cancer Paternal Grandmother 70    Colon cancer Neg Hx     Ovarian cancer Neg Hx     Uterine cancer Neg Hx     Melanoma Neg Hx     Stroke Neg Hx     Osteoporosis Neg Hx        Social History:   reports that she has never smoked. She has never used smokeless tobacco. She reports that she does not drink alcohol and does not use drugs.    Medications:   No medications prior to admission.       Allergies:  Ibuprofen and Codeine    ROS:    Resp: No SOA  Cardiovascular: No CP      Objective     /71 (BP Location: Left arm, Patient Position: Lying)   Pulse 80   Temp 97 øF (36.1 øC) (Temporal)    "Resp 15   Ht 162.6 cm (64\")   Wt 80.3 kg (177 lb)   LMP  (LMP Unknown)   SpO2 93%   BMI 30.38 kg/mý     Physical Exam   Constitutional: Patient is oriented to person, place, and in no distress.  Pulmonary/Chest: No distress.  No audible wheezes  Psychiatric: Mood, memory, affect and judgment appear normal.     Assessment & Plan     Diagnosis:  Change in bowel pattern  Diarrhea    Anticipated Surgical Procedure:  Colonoscopy    The risks, benefits, and alternatives of colonoscopy were reviewed with the patient today.  Risks including perforation of the colon possibly requiring surgery or colostomy.  Additional risks include risk of bleeding from biopsies or removal of colon tissue.  There is also the risk of a drug reaction or problems with anesthesia.  This will be discussed with the patient further by the anesthesia team on the day of the procedure.  Lastly there is a possibility of missing a colon polyp or cancer.  The benefits include the diagnosis and management of disease of the colon and rectum.  Alternatives to colonoscopy include barium enema, laboratory testing, radiographic evaluation, or no intervention.  The patient verbalizes understanding and agrees.        Please note that portions of this note were completed with a voice recognition program.         "

## 2023-08-17 NOTE — ANESTHESIA POSTPROCEDURE EVALUATION
Patient: PUNEET Villalba    Procedure Summary       Date: 08/17/23 Room / Location:  [facility] /  [facility]    Anesthesia Start: 1252 Anesthesia Stop: 1337    Procedure: COLONOSCOPY WITH ANESTHESIA Diagnosis:       Lower abdominal pain      Change in bowel habits      (Lower abdominal pain [R10.30])      (Change in bowel habits [R19.4])    Surgeons: Meghann Ovalle MD Provider: Fritz Hollingsworth CRNA    Anesthesia Type: MAC ASA Status: 2            Anesthesia Type: MAC    Vitals  Vitals Value Taken Time   /71 08/17/23 1336   Temp     Pulse 84 08/17/23 1337   Resp     SpO2 92 % 08/17/23 1337   Vitals shown include unvalidated device data.        Post Anesthesia Care and Evaluation    Patient location during evaluation: PHASE II

## 2023-08-17 NOTE — ANESTHESIA PREPROCEDURE EVALUATION
Anesthesia Evaluation     Patient summary reviewed   no history of anesthetic complications:   NPO Solid Status: > 8 hours  NPO Liquid Status: > 4 hours           Airway   Mallampati: I  TM distance: >3 FB  Neck ROM: full  No difficulty expected  Dental - normal exam     Pulmonary - negative pulmonary ROS and normal exam   Cardiovascular - normal exam  Exercise tolerance: excellent (>7 METS)    (+) hypertension well controlled less than 2 medicationshyperlipidemia      Neuro/Psych  (+) psychiatric history Depression  GI/Hepatic/Renal/Endo    (+) GERD well controlled, diabetes mellitus type 2 well controlled    Musculoskeletal (-) negative ROS    Abdominal  - normal exam   Substance History - negative use     OB/GYN          Other - negative ROS                       Anesthesia Plan    ASA 2     MAC     intravenous induction     Anesthetic plan, risks, benefits, and alternatives have been provided, discussed and informed consent has been obtained with: patient.

## 2023-08-18 LAB
CYTO UR: NORMAL
LAB AP CASE REPORT: NORMAL
Lab: NORMAL
PATH REPORT.FINAL DX SPEC: NORMAL
PATH REPORT.GROSS SPEC: NORMAL

## 2023-08-21 PROBLEM — R13.19 OTHER DYSPHAGIA: Status: RESOLVED | Noted: 2018-05-30 | Resolved: 2023-08-21

## 2025-02-11 ENCOUNTER — OFFICE VISIT (OUTPATIENT)
Dept: GASTROENTEROLOGY | Facility: CLINIC | Age: 60
End: 2025-02-11
Payer: COMMERCIAL

## 2025-02-11 VITALS
DIASTOLIC BLOOD PRESSURE: 70 MMHG | HEART RATE: 63 BPM | HEIGHT: 64 IN | TEMPERATURE: 97.4 F | BODY MASS INDEX: 31.07 KG/M2 | WEIGHT: 182 LBS | OXYGEN SATURATION: 97 % | SYSTOLIC BLOOD PRESSURE: 118 MMHG

## 2025-02-11 DIAGNOSIS — R10.30 LOWER ABDOMINAL PAIN: Primary | ICD-10-CM

## 2025-02-11 DIAGNOSIS — K59.09 OTHER CONSTIPATION: ICD-10-CM

## 2025-02-11 PROCEDURE — 99214 OFFICE O/P EST MOD 30 MIN: CPT | Performed by: NURSE PRACTITIONER

## 2025-02-11 RX ORDER — ERGOCALCIFEROL 1.25 MG/1
50000 CAPSULE, LIQUID FILLED ORAL
COMMUNITY
Start: 2025-02-03

## 2025-02-11 NOTE — PROGRESS NOTES
"Primary Physician: Jumana Cole, DO    Chief Complaint   Patient presents with    Abdominal Pain     Pt c/o lower abdominal pain \"for years\"-states at times it is very bad and swells up but other days the pain isn't that bad-will use Tylenol prn and if she \"takes enough\" it will help; Pt has tried Dicyclomine in the past but it didn't help so she quit taking it     Alternating bowel habits     Pt also c/o issues with alternating bowel habits-will be constipated and go up to a week without a BM then she will start having diarrhea; Pt's last colon was 8/17/2023       Subjective     PUNEET Villalba is a 59 y.o. female.    HPI  Abdominal pain  Pt complaints of lower abdominal pain associated with bloating that is intermittent.  She calls it \"episodes\" it lasts 2-3 days during these \"bouts\" and when it occurs the discomfort is constant.   This has been ongoing for > 5 years.  No weight loss.  Pt only has 1 BM per week. No blood in her stools.  Constipation certainly seems to be contributing to the issue.    She tried Miralax daily as well as ALign and that did not help any.  She has increased her water intake, which may have helped.  She has tried Linzess and that lead to side effects of abd pain.    8/17/2023 Colonoscopy unremarkable. Normal mucosa in entire colon.  Random colon biopsy showing no evidence of microscopic colitis.    Last CT Abdomen & Pelvis 6/2022  with no free abscess, no ascites, no bowel obstruction, prior cholecystectomy and prior appendectomy. Small fundal uterine leiomyomas with no adnexal pathology.      Family history of colon Polyps  Mother had colon polyps but her age is unknown.      GERD  Patient with a history of acid reflux disease.  She is maintained on Protonix 40 mg once daily.  6/6/2022 his last creatinine on file normal at 1.10    7/19/2018 endoscopy revealing a small hiatal hernia with few fundic gland gastric polyps, and duodenum was normal.    Past Medical History: "   Diagnosis Date    ADD (attention deficit disorder)     Anxiety     Depression     Family history of colonic polyps     GERD (gastroesophageal reflux disease)     Hyperlipidemia     Hypertension     Type 2 diabetes mellitus        Past Surgical History:   Procedure Laterality Date    APPENDECTOMY      CHOLECYSTECTOMY      COLONOSCOPY N/A 07/18/2018    The entire examined colon is normal on direct and retroflexion views; No specimens collected; Repeat 10 years    COLONOSCOPY  07/24/2013    Normal colonoscopy; Repeat 5 years    COLONOSCOPY  07/18/2001    Dr. Alvarado-Normal colonoscopy; Right-sided biopsies obtained    COLONOSCOPY N/A 08/17/2023    Normal mucosa in the entire examined colon-biopsied; Repeat 5 years    ENDOSCOPY N/A 07/18/2018    Small HH; A few gastric polyps-biopsied; Normal examined duodenum    ENDOSCOPY  10/07/2013    HH; Normal stomach; Normal examined duodenum    KNEE ARTHROSCOPY Left     WISDOM TOOTH EXTRACTION          Current Outpatient Medications:     ALPRAZolam (XANAX) 0.5 MG tablet, Take 1 tablet by mouth 2 (Two) Times a Day As Needed for Anxiety., Disp: , Rfl:     aspirin 81 MG tablet, Take 1 tablet by mouth Daily., Disp: , Rfl:     cyclobenzaprine (FLEXERIL) 10 MG tablet, Take 1 tablet by mouth 3 (Three) Times a Day As Needed., Disp: , Rfl:     FLUoxetine (PROzac) 40 MG capsule, Take 40 mg by mouth daily, Disp: , Rfl:     lisinopril (PRINIVIL,ZESTRIL) 40 MG tablet, Take 1 tablet by mouth Daily., Disp: , Rfl:     lovastatin (MEVACOR) 40 MG tablet, Take 1 tablet by mouth Every Night., Disp: , Rfl:     pantoprazole (PROTONIX) 40 MG pack packet, Take 1 packet by mouth Every Morning Before Breakfast., Disp: , Rfl:     SITagliptin (JANUVIA) 50 MG tablet, Take 1 tablet by mouth Daily., Disp: , Rfl:     vitamin D (ERGOCALCIFEROL) 1.25 MG (85055 UT) capsule capsule, Take 1 capsule by mouth Every 7 (Seven) Days., Disp: , Rfl:     Allergies   Allergen Reactions    Ibuprofen Swelling    Codeine  "Nausea And Vomiting       Social History     Socioeconomic History    Marital status: Single   Tobacco Use    Smoking status: Never    Smokeless tobacco: Never   Vaping Use    Vaping status: Never Used   Substance and Sexual Activity    Alcohol use: No    Drug use: No    Sexual activity: Not Currently     Birth control/protection: Post-menopausal       Family History   Problem Relation Age of Onset    Colon polyps Mother         Unknown age    Diabetes Mother     Hypertension Mother     Hypertension Father     Breast cancer Paternal Grandmother 70    Colon cancer Neg Hx     Ovarian cancer Neg Hx     Uterine cancer Neg Hx     Melanoma Neg Hx     Stroke Neg Hx     Osteoporosis Neg Hx     Esophageal cancer Neg Hx     Liver cancer Neg Hx     Stomach cancer Neg Hx     Rectal cancer Neg Hx     Liver disease Neg Hx        Review of Systems   Constitutional:  Negative for unexpected weight change.   Gastrointestinal:  Positive for abdominal pain and constipation.       Objective     /70 (BP Location: Left arm, Patient Position: Sitting, Cuff Size: Adult)   Pulse 63   Temp 97.4 °F (36.3 °C) (Infrared)   Ht 162.6 cm (64\")   Wt 82.6 kg (182 lb)   LMP  (LMP Unknown)   SpO2 97%   Breastfeeding No   BMI 31.24 kg/m²     Physical Exam  Abdominal:      General: Abdomen is flat. Bowel sounds are normal.      Palpations: Abdomen is soft.      Tenderness: There is no abdominal tenderness.             IMPRESSION/PLAN:    Assessment & Plan      Problem List Items Addressed This Visit       Abdominal pain - Primary    Overview     Low abd pain associated with bowel alterations for greater than 5 years associated with constipation.  6/2022 CT Scan of Abdomen & Pelvis  8/17/2023 Colonoscopy unremarkable         Other constipation    Overview     Normal colonoscopy 7/18/2018.  8/2023 Colonoscopy with biopsies for microscopic colitis.  Repeat colonoscopy 8/2028.         Current Assessment & Plan     Begin Miralax BID. Call in 2 " "weeks with an update and if no better add Metamucil gummy fiber. Hoping treating constipation will help alleviate \"bouts\" of abd pain. Follow Up 2 months. If no better I have also advised pelvic ultrasound.          If pain persist and she has not benefited we will consider pelvic ultrasound  Treat constipation and see if this will help with any of the pain  Follow-up in 2 months            Yashira Manrique, APRN  02/11/25  14:34 CST    Part of this note may be an electronic transcription/translation of spoken language to printed text.      "

## 2025-02-11 NOTE — ASSESSMENT & PLAN NOTE
"Begin Miralax BID. Call in 2 weeks with an update and if no better add Metamucil gummy fiber. Hoping treating constipation will help alleviate \"bouts\" of abd pain. Follow Up 2 months. If no better I have also advised pelvic ultrasound.  "

## (undated) DEVICE — ENDOGATOR AUXILIARY WATER JET CONNECTOR: Brand: ENDOGATOR

## (undated) DEVICE — MASK,OXYGEN,MED CONC,ADLT,7' TUB, UC: Brand: PENDING

## (undated) DEVICE — SENSR O2 OXIMAX FNGR A/ 18IN NONSTR

## (undated) DEVICE — FRCP BIOP ENDO CAPTURA/PRO SERR SPK 2.8MM 230CM

## (undated) DEVICE — TBG SMPL FLTR LINE NASL 02/C02 A/ BX/100

## (undated) DEVICE — THE CHANNEL CLEANING BRUSH IS A NYLON FLEXI BRUSH ATTACHED TO A FLEXIBLE PLASTIC SHEATH DESIGNED TO SAFELY REMOVE DEBRIS FROM FLEXIBLE ENDOSCOPES.

## (undated) DEVICE — CUFF,BP,DISP,1 TUBE,ADULT,HP: Brand: MEDLINE

## (undated) DEVICE — YANKAUER,BULB TIP WITH VENT: Brand: ARGYLE

## (undated) DEVICE — CONMED SCOPE SAVER BITE BLOCK, 20X27 MM: Brand: SCOPE SAVER

## (undated) DEVICE — Device: Brand: DEFENDO AIR/WATER/SUCTION AND BIOPSY VALVE